# Patient Record
Sex: FEMALE | Race: BLACK OR AFRICAN AMERICAN | URBAN - METROPOLITAN AREA
[De-identification: names, ages, dates, MRNs, and addresses within clinical notes are randomized per-mention and may not be internally consistent; named-entity substitution may affect disease eponyms.]

---

## 2020-12-30 ENCOUNTER — OFFICE VISIT (OUTPATIENT)
Dept: URBAN - METROPOLITAN AREA CLINIC 105 | Facility: CLINIC | Age: 75
End: 2020-12-30
Payer: MEDICARE

## 2020-12-30 DIAGNOSIS — K57.90 DIVERTICULOSIS: ICD-10-CM

## 2020-12-30 DIAGNOSIS — K21.9 GERD: ICD-10-CM

## 2020-12-30 DIAGNOSIS — R10.10 UPPER ABDOMINAL PAIN: ICD-10-CM

## 2020-12-30 PROBLEM — 235595009 GASTROESOPHAGEAL REFLUX DISEASE: Status: ACTIVE | Noted: 2020-12-30

## 2020-12-30 PROBLEM — 398050005 DIVERTICULAR DISEASE OF COLON: Status: ACTIVE | Noted: 2020-12-30

## 2020-12-30 PROCEDURE — G9903 PT SCRN TBCO ID AS NON USER: HCPCS | Performed by: INTERNAL MEDICINE

## 2020-12-30 PROCEDURE — G8483 FLU IMM NO ADMIN DOC REA: HCPCS | Performed by: INTERNAL MEDICINE

## 2020-12-30 PROCEDURE — 99213 OFFICE O/P EST LOW 20 MIN: CPT | Performed by: INTERNAL MEDICINE

## 2020-12-30 PROCEDURE — 3017F COLORECTAL CA SCREEN DOC REV: CPT | Performed by: INTERNAL MEDICINE

## 2020-12-30 PROCEDURE — G8417 CALC BMI ABV UP PARAM F/U: HCPCS | Performed by: INTERNAL MEDICINE

## 2020-12-30 PROCEDURE — G8427 DOCREV CUR MEDS BY ELIG CLIN: HCPCS | Performed by: INTERNAL MEDICINE

## 2020-12-30 RX ORDER — VALSARTAN AND HYDROCHLOROTHIAZIDE 160; 12.5 MG/1; MG/1
TAKE 1 TABLET BY ORAL ROUTE ONCE DAILY TABLET, FILM COATED ORAL 1
Qty: 0 | Refills: 0 | Status: ACTIVE | COMMUNITY
Start: 1900-01-01 | End: 1900-01-01

## 2020-12-30 RX ORDER — PANTOPRAZOLE SODIUM 40 MG/1
1 TABLET TABLET, DELAYED RELEASE ORAL BID
Qty: 180 TABLET | Refills: 0 | OUTPATIENT
Start: 2020-12-30

## 2020-12-30 NOTE — HPI-TODAY'S VISIT:
Pt comes to the [facility] office today in follow up. she typically is followed with Dr. Villanueva but sees me today due to an office schedule issue. she reports that she has been gaining weight since the pandemic. she has not been as faithful taking PPI as she should. not having any vomiting. -  she reports that she has had some upper abdominal discomfort for about the last 2 weeks. epigastric area. no fever. she reports having her gallbladder removed.

## 2021-01-05 ENCOUNTER — OFFICE VISIT (OUTPATIENT)
Dept: URBAN - METROPOLITAN AREA CLINIC 91 | Facility: CLINIC | Age: 76
End: 2021-01-05
Payer: MEDICARE

## 2021-01-05 DIAGNOSIS — R10.84 ABDOMINAL CRAMPING, GENERALIZED: ICD-10-CM

## 2021-01-05 DIAGNOSIS — K21.9 ACID REFLUX: ICD-10-CM

## 2021-01-05 PROCEDURE — 76700 US EXAM ABDOM COMPLETE: CPT | Performed by: INTERNAL MEDICINE

## 2021-01-05 RX ORDER — PANTOPRAZOLE SODIUM 40 MG/1
1 TABLET TABLET, DELAYED RELEASE ORAL BID
Qty: 180 TABLET | Refills: 0 | Status: ACTIVE | COMMUNITY
Start: 2020-12-30

## 2021-01-05 RX ORDER — VALSARTAN AND HYDROCHLOROTHIAZIDE 160; 12.5 MG/1; MG/1
TAKE 1 TABLET BY ORAL ROUTE ONCE DAILY TABLET, FILM COATED ORAL 1
Qty: 0 | Refills: 0 | Status: ACTIVE | COMMUNITY
Start: 1900-01-01 | End: 1900-01-01

## 2021-01-11 ENCOUNTER — TELEPHONE ENCOUNTER (OUTPATIENT)
Dept: URBAN - METROPOLITAN AREA CLINIC 105 | Facility: CLINIC | Age: 76
End: 2021-01-11

## 2021-01-25 ENCOUNTER — OFFICE VISIT (OUTPATIENT)
Dept: URBAN - METROPOLITAN AREA CLINIC 17 | Facility: CLINIC | Age: 76
End: 2021-01-25
Payer: MEDICARE

## 2021-01-25 DIAGNOSIS — K57.30 DIVERTICULAR DISEASE OF COLON: ICD-10-CM

## 2021-01-25 DIAGNOSIS — E55.9 VITAMIN D DEFICIENCY DISEASE: ICD-10-CM

## 2021-01-25 DIAGNOSIS — K21.00 GASTROESOPHAGEAL REFLUX DISEASE WITH ESOPHAGITIS WITHOUT HEMORRHAGE: ICD-10-CM

## 2021-01-25 DIAGNOSIS — E65 CENTRAL OBESITY: ICD-10-CM

## 2021-01-25 DIAGNOSIS — K76.0 HEPATIC STEATOSIS: ICD-10-CM

## 2021-01-25 PROBLEM — 248311001: Status: ACTIVE | Noted: 2021-01-25

## 2021-01-25 PROCEDURE — G8482 FLU IMMUNIZE ORDER/ADMIN: HCPCS | Performed by: INTERNAL MEDICINE

## 2021-01-25 PROCEDURE — 99213 OFFICE O/P EST LOW 20 MIN: CPT | Performed by: INTERNAL MEDICINE

## 2021-01-25 PROCEDURE — 1036F TOBACCO NON-USER: CPT | Performed by: INTERNAL MEDICINE

## 2021-01-25 PROCEDURE — 3017F COLORECTAL CA SCREEN DOC REV: CPT | Performed by: INTERNAL MEDICINE

## 2021-01-25 PROCEDURE — G8417 CALC BMI ABV UP PARAM F/U: HCPCS | Performed by: INTERNAL MEDICINE

## 2021-01-25 RX ORDER — PANTOPRAZOLE SODIUM 40 MG/1
1 TABLET TABLET, DELAYED RELEASE ORAL BID
Qty: 180 TABLET | Refills: 0 | Status: ACTIVE | COMMUNITY
Start: 2020-12-30

## 2021-01-25 RX ORDER — VALSARTAN AND HYDROCHLOROTHIAZIDE 160; 12.5 MG/1; MG/1
TAKE 1 TABLET BY ORAL ROUTE ONCE DAILY TABLET, FILM COATED ORAL 1
Qty: 0 | Refills: 0 | Status: ACTIVE | COMMUNITY
Start: 1900-01-01

## 2021-01-25 NOTE — HPI-OTHER HISTORIES
The pt has a history of GERD and dyspepsia and was seen by Dr Rice in 12/2020 and was prescribed PPI BID. She notes that she has been eating beef and lamb and noted increased abdominal pain with gas and bloating She notes that she is having regular BM's.  She has been non-complaint with her diet.  She is due for f/u colon 12/2023. She recently had an abd u/s revealing hepatic steatosis. She is trying to lose weight.

## 2021-03-19 ENCOUNTER — TELEPHONE ENCOUNTER (OUTPATIENT)
Dept: URBAN - METROPOLITAN AREA CLINIC 23 | Facility: CLINIC | Age: 76
End: 2021-03-19

## 2021-04-26 ENCOUNTER — OFFICE VISIT (OUTPATIENT)
Dept: URBAN - METROPOLITAN AREA CLINIC 17 | Facility: CLINIC | Age: 76
End: 2021-04-26
Payer: MEDICARE

## 2021-04-26 ENCOUNTER — WEB ENCOUNTER (OUTPATIENT)
Dept: URBAN - METROPOLITAN AREA CLINIC 17 | Facility: CLINIC | Age: 76
End: 2021-04-26

## 2021-04-26 DIAGNOSIS — K92.89 GAS BLOAT SYNDROME: ICD-10-CM

## 2021-04-26 DIAGNOSIS — K76.0 HEPATIC STEATOSIS: ICD-10-CM

## 2021-04-26 DIAGNOSIS — E55.9 VITAMIN D DEFICIENCY DISEASE: ICD-10-CM

## 2021-04-26 DIAGNOSIS — K21.00 GASTROESOPHAGEAL REFLUX DISEASE WITH ESOPHAGITIS WITHOUT HEMORRHAGE: ICD-10-CM

## 2021-04-26 PROBLEM — 197321007: Status: ACTIVE | Noted: 2021-01-25

## 2021-04-26 PROCEDURE — 99214 OFFICE O/P EST MOD 30 MIN: CPT | Performed by: INTERNAL MEDICINE

## 2021-04-26 RX ORDER — VALSARTAN AND HYDROCHLOROTHIAZIDE 160; 12.5 MG/1; MG/1
TAKE 1 TABLET BY ORAL ROUTE ONCE DAILY TABLET, FILM COATED ORAL 1
Qty: 0 | Refills: 0 | Status: ACTIVE | COMMUNITY
Start: 1900-01-01

## 2021-04-26 RX ORDER — PANTOPRAZOLE SODIUM 40 MG/1
1 TABLET TABLET, DELAYED RELEASE ORAL BID
Qty: 180 TABLET | Refills: 0 | Status: ACTIVE | COMMUNITY
Start: 2020-12-30

## 2021-04-26 NOTE — PHYSICAL EXAM GASTROINTESTINAL
Abdomen , soft, nontender, nondistended , no guarding or rigidity , no masses palpable , normal bowel sounds , Liver and Spleen , no hepatomegaly present , no hepatosplenomegaly , liver nontender , spleen not palpable , Rectal , normal sphincter tone , no internal hemorrhoids, rectal masses or bleeding present , Abdomen , soft, nontender, nondistended , no guarding or rigidity , no masses palpable , normal bowel sounds , Liver and Spleen , no hepatomegaly present , no hepatosplenomegaly , liver nontender , spleen not palpable

## 2021-04-26 NOTE — HPI-TODAY'S VISIT:
The pt now presents for a f/u office visit who presents for a f/u office visit.  The pt has a history of fatty liver and she notes that she is working out and losing weight as well.  The pt is due for f/u colon in 12/2023.

## 2021-10-18 ENCOUNTER — WEB ENCOUNTER (OUTPATIENT)
Dept: URBAN - METROPOLITAN AREA CLINIC 17 | Facility: CLINIC | Age: 76
End: 2021-10-18

## 2021-10-18 ENCOUNTER — OFFICE VISIT (OUTPATIENT)
Dept: URBAN - METROPOLITAN AREA CLINIC 17 | Facility: CLINIC | Age: 76
End: 2021-10-18
Payer: MEDICARE

## 2021-10-18 DIAGNOSIS — K76.0 FATTY LIVER: ICD-10-CM

## 2021-10-18 DIAGNOSIS — I10 PRIMARY HYPERTENSION: ICD-10-CM

## 2021-10-18 DIAGNOSIS — R10.13 DYSPEPSIA: ICD-10-CM

## 2021-10-18 DIAGNOSIS — K57.30 DIVERTICULAR DISEASE OF COLON: ICD-10-CM

## 2021-10-18 DIAGNOSIS — M85.89 OSTEOPENIA OF MULTIPLE SITES: ICD-10-CM

## 2021-10-18 DIAGNOSIS — K21.00 GASTROESOPHAGEAL REFLUX DISEASE WITH ESOPHAGITIS WITHOUT HEMORRHAGE: ICD-10-CM

## 2021-10-18 DIAGNOSIS — E55.9 VITAMIN D DEFICIENCY DISEASE: ICD-10-CM

## 2021-10-18 PROBLEM — 733657002: Status: ACTIVE | Noted: 2021-01-25

## 2021-10-18 PROBLEM — 266433003: Status: ACTIVE | Noted: 2021-10-18

## 2021-10-18 PROBLEM — 34713006: Status: ACTIVE | Noted: 2021-01-25

## 2021-10-18 PROBLEM — 197321007: Status: ACTIVE | Noted: 2021-10-18

## 2021-10-18 PROBLEM — 68295002: Status: ACTIVE | Noted: 2021-10-18

## 2021-10-18 PROBLEM — 59621000: Status: ACTIVE | Noted: 2021-10-18

## 2021-10-18 PROCEDURE — 99214 OFFICE O/P EST MOD 30 MIN: CPT | Performed by: INTERNAL MEDICINE

## 2021-10-18 RX ORDER — FUROSEMIDE 40 MG/1
1 TABLET TABLET ORAL ONCE A DAY
Status: ACTIVE | COMMUNITY

## 2021-10-18 RX ORDER — PANTOPRAZOLE SODIUM 40 MG/1
1 TABLET TABLET, DELAYED RELEASE ORAL BID
Qty: 180 TABLET | Refills: 0 | Status: ACTIVE | COMMUNITY
Start: 2020-12-30

## 2021-10-18 RX ORDER — VALSARTAN AND HYDROCHLOROTHIAZIDE 320; 25 MG/1; MG/1
TAKE 1 TABLET BY ORAL ROUTE ONCE DAILY TABLET, FILM COATED ORAL 1
Qty: 0 | Refills: 0 | Status: ACTIVE | COMMUNITY
Start: 1900-01-01

## 2021-10-18 NOTE — HPI-TODAY'S VISIT:
The patient has a history of fatty liver and presents for a f/u offie visit. The pt is due for a screening colon in 12/2023 as she has struggled with obesity for several years. She notes that she has been workiing out as well. The pt notes that she has has intermittent gas and bloating and globus like symptoms.

## 2022-07-08 ENCOUNTER — TELEPHONE ENCOUNTER (OUTPATIENT)
Dept: URBAN - METROPOLITAN AREA CLINIC 17 | Facility: CLINIC | Age: 77
End: 2022-07-08

## 2022-07-08 RX ORDER — PANTOPRAZOLE SODIUM 40 MG/1
1 TABLET TABLET, DELAYED RELEASE ORAL TWICE A DAY
Qty: 180 TABLET | Refills: 2
Start: 2020-12-30

## 2024-01-11 ENCOUNTER — OFFICE VISIT (OUTPATIENT)
Dept: URBAN - METROPOLITAN AREA CLINIC 105 | Facility: CLINIC | Age: 79
End: 2024-01-11
Payer: MEDICARE

## 2024-01-11 VITALS
TEMPERATURE: 97 F | HEIGHT: 65 IN | SYSTOLIC BLOOD PRESSURE: 129 MMHG | BODY MASS INDEX: 28.82 KG/M2 | WEIGHT: 173 LBS | DIASTOLIC BLOOD PRESSURE: 84 MMHG | HEART RATE: 85 BPM

## 2024-01-11 DIAGNOSIS — M54.50 LOW BACK PAIN, UNSPECIFIED: ICD-10-CM

## 2024-01-11 DIAGNOSIS — R93.5 ABNORMAL CT SCAN, PELVIS: ICD-10-CM

## 2024-01-11 DIAGNOSIS — K76.0 HEPATIC STEATOSIS: ICD-10-CM

## 2024-01-11 DIAGNOSIS — K76.89 HEPATIC CYST: ICD-10-CM

## 2024-01-11 DIAGNOSIS — G89.29 OTHER CHRONIC PAIN: ICD-10-CM

## 2024-01-11 DIAGNOSIS — C49.9: ICD-10-CM

## 2024-01-11 PROBLEM — 85057007: Status: ACTIVE | Noted: 2024-01-11

## 2024-01-11 PROBLEM — 82423001: Status: ACTIVE | Noted: 2024-01-11

## 2024-01-11 PROCEDURE — 99214 OFFICE O/P EST MOD 30 MIN: CPT | Performed by: INTERNAL MEDICINE

## 2024-01-11 RX ORDER — PANTOPRAZOLE SODIUM 40 MG/1
1 TABLET TABLET, DELAYED RELEASE ORAL TWICE A DAY
Qty: 180 TABLET | Refills: 2 | Status: ACTIVE | COMMUNITY
Start: 2020-12-30

## 2024-01-11 RX ORDER — AZILSARTAN KAMEDOXOMIL AND CHLORTHALIDONE 40; 25 MG/1; MG/1
1 TABLET TABLET ORAL ONCE A DAY
Status: ACTIVE | COMMUNITY

## 2024-01-11 NOTE — HPI-TODAY'S VISIT:
The patient with a history of diverticular diseas of the colon, fatty liver and constipiaotn wno presents for f/u evaluatoin. The  pt has compoaints of increased lower abdominal pain/pelvic pain with radiation into the LLQ extending into the left leg and backl. Pt notes these symptoms for 6 months. CT of A/P 10/2023 + fatty liver, liver cyst, atrophic pancreas , and large lipomatous mass with extension imnto the retroperitoneum. Pt has also had low back pain and Right leg weakness,   The patient's time of visit DOS is 35 minutes. after review of old records and imaging.

## 2024-01-12 LAB
A/G RATIO: 1.4
ABSOLUTE BASOPHILS: 27
ABSOLUTE EOSINOPHILS: 129
ABSOLUTE LYMPHOCYTES: 1625
ABSOLUTE MONOCYTES: 381
ABSOLUTE NEUTROPHILS: 4638
ALBUMIN: 4.2
ALKALINE PHOSPHATASE: 73
ALT (SGPT): 10
AST (SGOT): 14
BASOPHILS: 0.4
BILIRUBIN, TOTAL: 0.9
BUN/CREATININE RATIO: 22
BUN: 26
CALCIUM: 9.5
CARBON DIOXIDE, TOTAL: 27
CHLORIDE: 103
CREATININE: 1.17
EGFR: 48
EOSINOPHILS: 1.9
GLOBULIN, TOTAL: 2.9
GLUCOSE: 89
HEMATOCRIT: 29.9
HEMOGLOBIN: 9.5
LYMPHOCYTES: 23.9
MCH: 27.1
MCHC: 31.8
MCV: 85.2
MONOCYTES: 5.6
MPV: 8.9
NEUTROPHILS: 68.2
PLATELET COUNT: 666
POTASSIUM: 4.4
PROTEIN, TOTAL: 7.1
RDW: 13.3
RED BLOOD CELL COUNT: 3.51
SODIUM: 139
WHITE BLOOD CELL COUNT: 6.8

## 2024-02-06 PROBLEM — 312894000: Status: ACTIVE | Noted: 2024-02-06

## 2024-02-22 ENCOUNTER — OV EP (OUTPATIENT)
Dept: URBAN - METROPOLITAN AREA CLINIC 105 | Facility: CLINIC | Age: 79
End: 2024-02-22
Payer: MEDICARE

## 2024-02-22 VITALS
HEART RATE: 90 BPM | HEIGHT: 65 IN | BODY MASS INDEX: 29.46 KG/M2 | TEMPERATURE: 97 F | DIASTOLIC BLOOD PRESSURE: 88 MMHG | WEIGHT: 176.8 LBS | SYSTOLIC BLOOD PRESSURE: 145 MMHG

## 2024-02-22 DIAGNOSIS — M54.40 ACUTE BACK PAIN WITH SCIATICA: ICD-10-CM

## 2024-02-22 DIAGNOSIS — G89.29 OTHER CHRONIC PAIN: ICD-10-CM

## 2024-02-22 DIAGNOSIS — K52.89 (LYMPHOCYTIC) MICROSCOPIC COLITIS: ICD-10-CM

## 2024-02-22 DIAGNOSIS — R19.00 PELVIC MASS: ICD-10-CM

## 2024-02-22 PROCEDURE — 99214 OFFICE O/P EST MOD 30 MIN: CPT | Performed by: INTERNAL MEDICINE

## 2024-02-22 RX ORDER — AZILSARTAN KAMEDOXOMIL AND CHLORTHALIDONE 40; 25 MG/1; MG/1
1 TABLET TABLET ORAL ONCE A DAY
Status: ACTIVE | COMMUNITY

## 2024-02-22 RX ORDER — PANTOPRAZOLE SODIUM 40 MG/1
1 TABLET TABLET, DELAYED RELEASE ORAL TWICE A DAY
Qty: 180 TABLET | Refills: 2 | Status: ON HOLD | COMMUNITY
Start: 2020-12-30

## 2024-02-22 NOTE — HPI-TODAY'S VISIT:
The patient has a history of a pelvic mass with extension into the left hemipelvis with involvement of Retroperitoneum tracking along eh left inquinal regoion and along anterior medial aspect of the left thigh. The pt recently had a MRI of pelvis with these findings noted. The pt also notes that she has had interittente diarrhea over the last several montths. She does drink sodas and sweet tea.   The patent's time of visit DOS is 35 minutes after review of the old records and op notes and imaging.

## 2024-04-25 ENCOUNTER — OV EP (OUTPATIENT)
Dept: URBAN - METROPOLITAN AREA CLINIC 105 | Facility: CLINIC | Age: 79
End: 2024-04-25
Payer: MEDICARE

## 2024-04-25 VITALS
DIASTOLIC BLOOD PRESSURE: 91 MMHG | BODY MASS INDEX: 29.32 KG/M2 | SYSTOLIC BLOOD PRESSURE: 169 MMHG | HEIGHT: 65 IN | WEIGHT: 176 LBS | TEMPERATURE: 98 F | HEART RATE: 87 BPM

## 2024-04-25 DIAGNOSIS — R54 ADVANCED AGE: ICD-10-CM

## 2024-04-25 DIAGNOSIS — K92.89 GAS BLOAT SYNDROME: ICD-10-CM

## 2024-04-25 DIAGNOSIS — C49.9 SARCOMA: ICD-10-CM

## 2024-04-25 PROBLEM — 49808004: Status: ACTIVE | Noted: 2024-04-25

## 2024-04-25 PROBLEM — 424413001: Status: ACTIVE | Noted: 2024-04-25

## 2024-04-25 PROCEDURE — 99214 OFFICE O/P EST MOD 30 MIN: CPT | Performed by: INTERNAL MEDICINE

## 2024-04-25 RX ORDER — AZILSARTAN KAMEDOXOMIL AND CHLORTHALIDONE 40; 25 MG/1; MG/1
1 TABLET TABLET ORAL ONCE A DAY
Status: ACTIVE | COMMUNITY

## 2024-04-25 NOTE — HPI-TODAY'S VISIT:
The patient has a history of a pelvis sarcoma, s/p recent expl lap  due to left RP mass and it was successfully removed by Dr. Petr Mallory with complex abdominal wall reconstruction. The pt s/p surgery 3/28/24 and she has now had increased gas and bloaitng. There is no evidence of metatatic dz. The pt will need a f/u CT scan for post op managment.   The patiet's time of visit DOS is 35 minutes after the las op notes and imaging .

## 2024-07-09 ENCOUNTER — DASHBOARD ENCOUNTERS (OUTPATIENT)
Age: 79
End: 2024-07-09

## 2024-07-18 ENCOUNTER — OFFICE VISIT (OUTPATIENT)
Dept: URBAN - METROPOLITAN AREA CLINIC 105 | Facility: CLINIC | Age: 79
End: 2024-07-18
Payer: MEDICARE

## 2024-07-18 VITALS
HEART RATE: 79 BPM | BODY MASS INDEX: 29.09 KG/M2 | TEMPERATURE: 96.6 F | WEIGHT: 174.6 LBS | HEIGHT: 65 IN | DIASTOLIC BLOOD PRESSURE: 69 MMHG | SYSTOLIC BLOOD PRESSURE: 113 MMHG

## 2024-07-18 DIAGNOSIS — R19.00 RETROPERITONEAL MASS: ICD-10-CM

## 2024-07-18 DIAGNOSIS — C49.9 SARCOMA: ICD-10-CM

## 2024-07-18 DIAGNOSIS — R19.4 CHANGE IN BOWEL HABITS: ICD-10-CM

## 2024-07-18 PROCEDURE — 99214 OFFICE O/P EST MOD 30 MIN: CPT | Performed by: INTERNAL MEDICINE

## 2024-07-18 RX ORDER — AZILSARTAN KAMEDOXOMIL AND CHLORTHALIDONE 40; 25 MG/1; MG/1
1 TABLET TABLET ORAL ONCE A DAY
Status: ACTIVE | COMMUNITY

## 2024-07-18 NOTE — HPI-TODAY'S VISIT:
The pt has a history of pelvic mass, s/p expl lap due to RP mass with dx of sarcoma 3/2024 who presents for a f/u ov. The pt notes that she has Xifaxan 550mg BID and she notes that her stoolds are regular but mushy. Sjhe contineus to strugle with ingestion of sugars leading to excessive gas with bloating. The pt note that she is sleelping well with no problems with snoring Last CT imaging neg fro recurrnent dieeasee.   The pt's time of visit DOS is 35 minutes after reviwe of the old records and op notes.

## 2024-12-12 ENCOUNTER — OFFICE VISIT (OUTPATIENT)
Dept: URBAN - METROPOLITAN AREA CLINIC 105 | Facility: CLINIC | Age: 79
End: 2024-12-12
Payer: MEDICARE

## 2024-12-12 VITALS
BODY MASS INDEX: 29.66 KG/M2 | HEART RATE: 86 BPM | DIASTOLIC BLOOD PRESSURE: 88 MMHG | TEMPERATURE: 97.4 F | WEIGHT: 178 LBS | HEIGHT: 65 IN | SYSTOLIC BLOOD PRESSURE: 137 MMHG

## 2024-12-12 DIAGNOSIS — K92.89 GAS BLOAT SYNDROME: ICD-10-CM

## 2024-12-12 DIAGNOSIS — R19.00 RETROPERITONEAL MASS: ICD-10-CM

## 2024-12-12 DIAGNOSIS — C49.9 SARCOMA: ICD-10-CM

## 2024-12-12 PROCEDURE — 99214 OFFICE O/P EST MOD 30 MIN: CPT | Performed by: INTERNAL MEDICINE

## 2024-12-12 RX ORDER — ROSUVASTATIN CALCIUM 5 MG/1
1 TABLET TABLET, COATED ORAL ONCE A DAY
Status: ACTIVE | COMMUNITY

## 2024-12-12 RX ORDER — MULTIVIT WITH MINERALS/LUTEIN
AS DIRECTED TABLET ORAL
Status: ACTIVE | COMMUNITY

## 2024-12-12 RX ORDER — AZILSARTAN KAMEDOXOMIL AND CHLORTHALIDONE 40; 25 MG/1; MG/1
1 TABLET TABLET ORAL ONCE A DAY
Status: ACTIVE | COMMUNITY

## 2024-12-12 NOTE — HPI-TODAY'S VISIT:
The patitent has a history of RP mass diagnosis of sarcoma and increased gas and bloating who now presents for evaluation of GI follow up. ov. The pt states that she is trying to satisfy too many people and she readily admits that she has neglected her health.   The pt's time of vist DoS is 35 mijnuts after reveiw of the old records and op notes.

## 2025-05-19 ENCOUNTER — TELEPHONE ENCOUNTER (OUTPATIENT)
Dept: URBAN - METROPOLITAN AREA CLINIC 105 | Facility: CLINIC | Age: 80
End: 2025-05-19

## 2025-05-20 ENCOUNTER — OFFICE VISIT (OUTPATIENT)
Dept: URBAN - METROPOLITAN AREA CLINIC 105 | Facility: CLINIC | Age: 80
End: 2025-05-20
Payer: MEDICARE

## 2025-05-20 DIAGNOSIS — R11.2 NAUSEA AND VOMITING, UNSPECIFIED VOMITING TYPE: ICD-10-CM

## 2025-05-20 DIAGNOSIS — R07.89 ATYPICAL CHEST PAIN: ICD-10-CM

## 2025-05-20 DIAGNOSIS — K21.00 GASTROESOPHAGEAL REFLUX DISEASE WITH ESOPHAGITIS WITHOUT HEMORRHAGE: ICD-10-CM

## 2025-05-20 DIAGNOSIS — R13.10 ODYNOPHAGIA: ICD-10-CM

## 2025-05-20 PROBLEM — 30233002: Status: ACTIVE | Noted: 2025-05-20

## 2025-05-20 PROCEDURE — 99214 OFFICE O/P EST MOD 30 MIN: CPT

## 2025-05-20 RX ORDER — ROSUVASTATIN CALCIUM 5 MG/1
1 TABLET TABLET, COATED ORAL ONCE A DAY
Status: ACTIVE | COMMUNITY

## 2025-05-20 RX ORDER — MULTIVIT WITH MINERALS/LUTEIN
AS DIRECTED TABLET ORAL
Status: ACTIVE | COMMUNITY

## 2025-05-20 RX ORDER — OMEPRAZOLE 40 MG/1
1 CAPSULE 1/2 TO 1 HOUR BEFORE MORNING MEAL CAPSULE, DELAYED RELEASE ORAL TWICE DAILY
Qty: 180 | Refills: 0 | OUTPATIENT
Start: 2025-05-20

## 2025-05-20 RX ORDER — AZILSARTAN KAMEDOXOMIL AND CHLORTHALIDONE 40; 25 MG/1; MG/1
1/2 TABLET TABLET ORAL ONCE A DAY
Status: ACTIVE | COMMUNITY

## 2025-05-20 NOTE — HPI-TODAY'S VISIT:
80-year-old female with PMH of GERD, retroperitoneal liposarcoma s/p resection, HLD, HTN, asthma, and osteoarthritis, presenting to discuss flare of acid reflux. She called 5/19/25 noting severe acid reflux with vomiting; she was recommended to take Nexium BID and famotidine at bedtime. She is s/p EGD and colonoscopy in 2018. Today, pt states she had the first "serious attack of reflux" that she's had in 20 years. States it hurts to eat and drink. Denies dysphagia. Woke her up at 3 AM with nausea and vomiting. States she feels lump in her throat and elephant sitting on her chest, under her arms, between her shoulder blades, and under her breastbone. States she has hoarseness.  States this is similar to what she experienced previously with GERD. Has not had any more vomiting since the first night. Denies rectal bleeding or melena.  States she has not been on any acid reflux regimen in the last 2 years. She has tried omeprazole, Prevacid, Nexium, Pepcid, Gaviscon for this episode of symptoms, but has not helped. States she ate a little when she took PPI medications, but not always.  Denies SOB, but states it makes her catch her breath. Denies palpitations. Does not feel worse with exertion, actually feels it gets better with movement. Denies starting new medications or supplements. Denies any significant changes in diet, but does admit that she would occasionally eat too late at night due to working late. Denies use of NSAIDs.  No known family history of colon cancer or colon polyps. States no personal history of colon polyps.

## 2025-05-20 NOTE — PHYSICAL EXAM CHEST:
breathing is unlabored without accessory muscle use, no significant tenderness to palpation of chest

## 2025-06-10 ENCOUNTER — OFFICE VISIT (OUTPATIENT)
Dept: URBAN - METROPOLITAN AREA SURGERY CENTER 16 | Facility: SURGERY CENTER | Age: 80
End: 2025-06-10

## 2025-07-14 ENCOUNTER — OFFICE VISIT (OUTPATIENT)
Dept: URBAN - METROPOLITAN AREA CLINIC 105 | Facility: CLINIC | Age: 80
End: 2025-07-14